# Patient Record
Sex: FEMALE | Race: AMERICAN INDIAN OR ALASKA NATIVE
[De-identification: names, ages, dates, MRNs, and addresses within clinical notes are randomized per-mention and may not be internally consistent; named-entity substitution may affect disease eponyms.]

---

## 2019-12-18 ENCOUNTER — HOSPITAL ENCOUNTER (EMERGENCY)
Dept: HOSPITAL 5 - ED | Age: 34
Discharge: HOME | End: 2019-12-18
Payer: SELF-PAY

## 2019-12-18 VITALS — DIASTOLIC BLOOD PRESSURE: 82 MMHG | SYSTOLIC BLOOD PRESSURE: 138 MMHG

## 2019-12-18 DIAGNOSIS — Z79.899: ICD-10-CM

## 2019-12-18 DIAGNOSIS — J45.909: ICD-10-CM

## 2019-12-18 DIAGNOSIS — J20.9: Primary | ICD-10-CM

## 2019-12-18 DIAGNOSIS — Z98.890: ICD-10-CM

## 2019-12-18 PROCEDURE — 71046 X-RAY EXAM CHEST 2 VIEWS: CPT

## 2019-12-18 NOTE — XRAY REPORT
CHEST 2 VIEWS 



INDICATION: 

cough.



COMPARISON: 

None.



FINDINGS:

Support devices: None.



Heart: Within normal limits. 

Lungs/Pleura: No acute air space or interstitial disease.   No significant pleural effusion. 



IMPRESSION:  No acute findings.



Signer Name: Pineda Crespo MD 

Signed: 12/18/2019 3:04 PM

 Workstation Name: [id number]

## 2019-12-18 NOTE — EMERGENCY DEPARTMENT REPORT
Blank Doc





- Documentation


Documentation: 





34-year-old female that presents with URI symptoms with fever.





This initial assessment/diagnostic orders/clinical plan/treatment(s) is/are 

subject to change based on patient's health status, clinical progression and re-

assessment by fellow clinical providers in the ED.  Further treatment and workup

at subsequent clinical providers discretion.  Patient/guardians urged not to 

elope from the ED as their condition may be serious if not clinically assessed 

and managed.  Initial orders include:


1- Patient sent to [facility] for further evaluation and treatment


2- CXR

## 2019-12-18 NOTE — EMERGENCY DEPARTMENT REPORT
ED General Adult HPI





- General


Chief complaint: Upper Respiratory Infection


Stated complaint: TEJA


Time Seen by Provider: 12/18/19 14:04


Source: patient


Mode of arrival: Ambulatory


Limitations: No Limitations





- History of Present Illness


Initial comments: 





Patient is a 34-year-old  female presented with 2 days of cough 

and congestion.  Patient's had fevers shortness of breath.  Patient denies 

bodyaches.  Patient has a pressure-like sensation in face and her left ear.  

Patient states the cough is painful.  She denies nausea vomiting or neck 

stiffness.





- Related Data


                                  Previous Rx's











 Medication  Instructions  Recorded  Last Taken  Type


 


ALBUTEROL Inhaler (OR & NICU) 2 puff IH QID PRN #1 inhalation 12/18/19 Unknown 

Rx





[ProAir HFA Inhaler]    


 


guaiFENesin/CODEINE [Robitussin AC] 5 ml PO Q6HR PRN #100 oral.liqd 12/18/19 

Unknown Rx


 


predniSONE [Deltasone] 20 mg PO QDAY #5 tab 12/18/19 Unknown Rx











                                    Allergies











Allergy/AdvReac Type Severity Reaction Status Date / Time


 


No Known Allergies Allergy   Unverified 12/18/19 14:11














ED Review of Systems


ROS: 


Stated complaint: TEJA


Other details as noted in HPI





Comment: All other systems reviewed and negative





ED Past Medical Hx





- Past Medical History


Previous Medical History?: Yes


Hx Asthma: Yes





- Surgical History


Past Surgical History?: Yes


Additional Surgical History: C section





- Social History


Smoking Status: Never Smoker


Substance Use Type: None





- Medications


Home Medications: 


                                Home Medications











 Medication  Instructions  Recorded  Confirmed  Last Taken  Type


 


ALBUTEROL Inhaler (OR & NICU) 2 puff IH QID PRN #1 inhalation 12/18/19  Unknown 

Rx





[ProAir HFA Inhaler]     


 


guaiFENesin/CODEINE [Robitussin AC] 5 ml PO Q6HR PRN #100 oral.liqd 12/18/19  

Unknown Rx


 


predniSONE [Deltasone] 20 mg PO QDAY #5 tab 12/18/19  Unknown Rx














ED Physical Exam





- General


Limitations: No Limitations


General appearance: alert, in no apparent distress





- Head


Head exam: Present: atraumatic, normocephalic





- Eye


Eye exam: Present: normal appearance





- ENT


ENT exam: Present: mucous membranes moist





- Neck


Neck exam: Present: normal inspection





- Respiratory


Respiratory exam: Present: rhonchi.  Absent: respiratory distress, wheezes, 

rales





- Cardiovascular


Cardiovascular Exam: Present: regular rate, normal rhythm.  Absent: systolic 

murmur, diastolic murmur, rubs, gallop





- GI/Abdominal


GI/Abdominal exam: Present: soft, normal bowel sounds.  Absent: distended, 

tenderness, guarding, rebound





- Extremities Exam


Extremities exam: Present: normal inspection





- Back Exam


Back exam: Present: normal inspection





- Neurological Exam


Neurological exam: Present: alert, oriented X3





- Psychiatric


Psychiatric exam: Present: normal affect, normal mood





- Skin


Skin exam: Present: warm, dry, intact, normal color.  Absent: rash





ED Course





                                   Vital Signs











  12/18/19





  14:09


 


Temperature 100.9 F H


 


Pulse Rate 98 H


 


Respiratory 20





Rate 


 


Blood Pressure 138/82


 


O2 Sat by Pulse 98





Oximetry 














ED Medical Decision Making





- Radiology Data





Fluoro Time In Minutes:





CHEST 2 VIEWS





INDICATION:


cough.





COMPARISON:


None.





FINDINGS:


Support devices: None.





Heart: Within normal limits.


Lungs/Pleura: No acute air space or interstitial disease. No significant pleural

effusion.





IMPRESSION: No acute findings.





Signer Name: Pineda Crespo MD


Signed: 12/18/2019 3:04 PM


Workstation Name: [id number]








- Medical Decision Making





Patient is a 34-year-old  female who is presenting with cough congestion 

and fever.  Patient's chest x-ray shows no obvious infiltrate but she does have 

some mild haziness per my interpretation is most consistent with atelectasis.  

Patient with rhonchi and likely acute bronchitis patient be treated 

appropriately.


Critical care attestation.: 


If time is entered above; I have spent that time in minutes in the direct care 

of this critically ill patient, excluding procedure time.








ED Disposition


Clinical Impression: 


 Acute bronchitis





Disposition: DC-01 TO HOME OR SELFCARE


Is pt being admited?: No


Does the pt Need Aspirin: No


Condition: Stable


Instructions:  Acute Bronchitis (ED)


Referrals: 


PRIMARY CARE,MD [Primary Care Provider] - 3-5 Days


Time of Disposition: 15:45